# Patient Record
(demographics unavailable — no encounter records)

---

## 2024-12-08 NOTE — PHYSICAL EXAM
[Restricted in physically strenuous activity but ambulatory and able to carry out work of a light or sedentary nature] : Status 1- Restricted in physically strenuous activity but ambulatory and able to carry out work of a light or sedentary nature, e.g., light house work, office work [Normal] : affect appropriate [de-identified] : anicteric  [de-identified] : supple, non tender, FROM [de-identified] : no edema

## 2024-12-08 NOTE — HISTORY OF PRESENT ILLNESS
[Disease: _____________________] : Disease: [unfilled] [T: ___] : T[unfilled] [N: ___] : N[unfilled] [M: ___] : M[unfilled] [AJCC Stage: ____] : AJCC Stage: [unfilled] [de-identified] : HPI:  3/17/15 - first saw Dr. ROSALBA Thacker (urology) for symptoms of BPH and PSA of 4.34 ng/mL. He started on Tamsulosin and lost his follow up ever since.   PSA trend: 3/17/15 - 4.34 ng/mL 9/1/17 - 5.30 4/622 - 18.35 5/27/22 - 26.00  6/11/22 - Prostate MRI: 3 Prostate lesions seen, with extraprostatic extension noted and neurovascular bundle involvement by lesion 1 on the right and lesion 3 on the left. No seminal vesicle invasion or pelvic adenopathy. No suspicious bone lesion identified. PIRADS 5  7/6/22 - underwent Prostate Biopsy: Adenocarcinoma of the Prostate in 15/17 cores, Zachery score 4+4 =8 in 1/17 cores, G4+3 in 2/17 cores, G3+4 in 10/17 cores, and G3+3 in 2/17 cores. Perineural invasion identified. Max involvement 90%.   7/21/22 - Bone Scan: No scan evidence of osseous metastasis.  7/27/22 - seen by Dr. Rizo, Radiation Onc [facility] risk of localized prostate cancer RT in combination with ADT and abiraterone/pred  7/28/2022 received eligard 45mg with bicalutamide two weeks prior   8/16/22 PSMA PET (pylarify): 1. Intense focus of increased radiotracer activity in right lateral aspect of prostate gland, extending from apex to base, corresponds to PIRADS 5 lesion identified on MRI/biopsy-proven prostate carcinoma. The intensity of radiotracer activity is compatible with high PSMA expression. A lesion in the left side of the prostate gland demonstrates low PSMA expression. 2. A 6 mm right posterior obturator lymph node with increased radiotracer activity (SUV 13.0), is compatible with metastasis.   Reports chronic lower back pain radiating to the both legs and feet on tramadol 50mg daily. Urine flow is normal. He denies frequency, hesitancy, urgency, incontinence,  and gross hematuria. Nocturia 3.    10/14/22: Since last visit, patient started RT with Dr. Rizo, he has been tolerating this well. He started taking abiraterone and prednisone about 3 weeks ago and so far has felt well. He is taking the medication on an empty stomach between 3 and 4PM each day. He is eating and drinking well. He is a bit more tired than normal, however he he still goes out and walks for 40 minutes a day. He denies urinary symptoms including dysuria, hematuria, urgency, frequency and incontinence. He has infrequent hot flashes, denies night sweats.   11/4/22: Overall patient has been feeling well on abiraterone and prednisone. His appetite and energy are good, he goes on walks every day. Denies dysuria, hematuria, reports nocturia 1-2 times a night. Having normal bowel movements approximately twice a day. Patient completed pelvic RT on 10/31 and after completing he now has pain in his R hip that is worst when moving to stand. Standing up actually makes it better. He has only taken tylenol for the pain which did not help. Patient does have obturator LN on R side and just finished RT which may be causing the pain.   11/21/22 feels tired, denies hot flash and sweating and leg swelling.   12/16/22: K 3.2 at last appt, 1 week of K supplementation improved to 3.9. Patient continues on abiraterone and prednisone. He takes the prednisone with breakfast and takes abiraterone at about 2-3PM as he does not normally have lunch. Overall he has been feeling well, pain that he previously described in hip and abdomen are improved/resolved. He does still take tramadol twice a day. He has been unable to go on walks due to the cold weather however he has been staying active. Reports nocturia x 2. Patient denies fever, chills, headache, vision changes, cough, shortness of breath, chest pain, palpitations, abdominal pain, nausea/vomiting, diarrhea, constipation, dysuria, hematuria, itching, rashes, joint pain, mucositis, changes in sensation, edema.  1/13/23: Patient has been feeling well. Takes abiraterone in the morning on an empty stomach, prednisone with breakfast. Has nocturia 2-4 times a night. Appetite and energy are good. He is hoping to go to [facility] for 2-3 weeks.    3/6/23: continued on chris/pred for high risk localized prostate ca. His BP today is 159/95, he is asymptomatic, he does not take any blood pressure medications. His HR is 101. Pt denies changes in vision, chest pain, palpitations, shortness of breath, nausea/vomiting and paraesthesias. He does have occasional self limiting headaches. He went t his PCP two days ago and his BP was "normal". Pt returned from [facility] last week, he has been very jet lagged and has been sleeping a lot. He reports soreness in his b/l legs, denies swelling. He has ongoing low back pain that he takes tramadol for as needed. He has a non productive cough, has been using OTC cough medication. Denies fever, chills, sore throat, congestion.   3/20/23: continued on chris/pred for high risk localized prostate ca, returns for BP check. His BP today is 130/87, HR 95. They have been taking BP at home however do not have a list of the measurements. They think the systolic is usually in the 130s and diastolic is usually in the 70-80s. He denies headache, blurry vision/vision changes, nausea, vomiting, shortness of breath, chest pain, palpitations, paraesthesias. He is fatigued and does not do very much throughout the day but his energy has improved from 2 weeks ago after returning from [facility]. He is eating well. He has pain in his back down his legs (not neuropathic pain) he follows with pain management and gets back injections, he takes tramadol 50-100mg once a day. The pain he had in his legs at the most recent visit has resolved, no swelling. Denies urine leakage and incontinence, reports nocturia 2-3 Sometimes has pain in his R inguinal area, unchanged from prior. Having normal bowel movements.   4/17/23: continued on chris/pred for high risk localized prostate cancer.  Taking BP every other day at home, normally runs 120s/80s. No burning with urination or pain, no difficulty empty, no leakage, nocturia x 4. Fatigue is at baseline, energy level might be slightly increasing, now that weather is nicer going out for walks, no dyspnea on exertion, can walk one mile. Intermittent pain in his R side, on and off, not severe. Has ongoing low back pain, pain radiates down legs, uses tramadol once a day. No numbness in hands or feet. L Appetite is good. Occasional itching on skin no rash, was given mupirocin by PCP.  No hot flashes or sweating.  Patient denies fever, chills, headache, vision changes, cough, shortness of breath, chest pain, palpitations, abdominal pain, nausea/vomiting, diarrhea, constipation, dysuria, hematuria, itching, rashes, joint pain, mucositis, changes in sensation.   5/24/23: doing well today, no new issues with abiraterone   8/2/23 feels overall fine, denies fatigue, hot flashes and sweating, freguent urination and gross hematuria.   11/1/23: Patient is doing well, eating great. He does have energy, but gets tired easily. He does have trouble sleeping at night, sleeps throughout the day, occasionally takes trazadone or ambien to help with sleep. He has ongoing low back pain, takes tramadol once a day, pain radiates down legs. He has ongoing numbness in his feet, no falls, walks slowly without issue. He has urine leakage at night, is not wearing diaper, no leakage during the day. Nocturia x 3-4. No hematuria or dysuria. Sometimes feels as though he is not completely emptying his bladder. BP today 161/92, denies headache and vision changes, he takes BP at home and generally it runs 160/90, he is not on BP medication, he is seeing PCP today.    1/8/24 feels well , good appetite, reports occasional sweating, denies fatigue/hot flash, Has normal urination.  [de-identified] : 3/13/24: Patient was admitted to [facility] on Jan 24, he was sent from his PCP to the ED for chest pain and EKG changes, he was found to have type I NSTEMI, cardiac cath showed occluded LAD, distal to prior stent, s/p GEORGIE x 2 Jan 2024. He also had TTE which showed EF 45% and moderate TR. He was also diagnosed with PE, CTA was positive for RLL PE. Patient was started on xarelto, ASA was discontinued. This information was obtained from [facility] portal/discharge summary. His BP today is 148/82, reports that at home systolic BP generally runs about 160s. His HR right not is 105, he denies chest pain and palpitations. He denies shortness of breath/difficulty breathing. No edema. No difficulty walking.   Since being discharged from the hospital, he has been doing okay and recovering. Says his appetite is okay, no issues with constipation or diarrhea. No hot flashes. Nocturia x 2, occ dribbling.    6/13/24 Reports feeling overall fine, denies chest pain, shortness of breath on exertion and leg swelling.   9/9/24 pt is doing well since completing treatment. Appetite is good and maintaining weight. Denies diarrhea or constipation. Denies hot flash/fatigue.  [de-identified] : prostate adenocarcinoma

## 2024-12-08 NOTE — PHYSICAL EXAM
[Restricted in physically strenuous activity but ambulatory and able to carry out work of a light or sedentary nature] : Status 1- Restricted in physically strenuous activity but ambulatory and able to carry out work of a light or sedentary nature, e.g., light house work, office work [Normal] : affect appropriate [de-identified] : anicteric  [de-identified] : no edema [de-identified] : supple, non tender, FROM

## 2024-12-08 NOTE — REVIEW OF SYSTEMS
normal.   Eyes:      Conjunctiva/sclera: Conjunctivae normal.   Cardiovascular:      Rate and Rhythm: Normal rate and regular rhythm.      Heart sounds: Normal heart sounds.   Pulmonary:      Effort: Pulmonary effort is normal. No respiratory distress.      Breath sounds: Normal breath sounds.   Abdominal:      General: Abdomen is flat. There is no distension.      Tenderness: There is no abdominal tenderness. There is no right CVA tenderness or left CVA tenderness.   Musculoskeletal:         General: No swelling.      Lumbar back: Spasms present. No swelling, edema, deformity, signs of trauma, lacerations, tenderness or bony tenderness. Normal range of motion (pain side flexion left). Negative right straight leg raise test and negative left straight leg raise test. No scoliosis.   Lymphadenopathy:      Cervical: No cervical adenopathy.   Skin:     General: Skin is dry.   Neurological:      General: No focal deficit present.      Mental Status: He is alert.   Psychiatric:         Mood and Affect: Mood normal.           Normal rango of motion flexion, extension nomral, cannot side flexion left       ASSESSMENT/PLAN:   Diagnosis Orders   1. Acute left-sided low back pain without sciatica  methylPREDNISolone (MEDROL DOSEPACK) 4 MG tablet    tiZANidine (ZANAFLEX) 4 MG tablet      2. Degeneration of intervertebral disc of lumbar region with discogenic back pain          Patient Instructions   - Medrol (oral steroid) with food,  do not take with  Ibuprofen (max dose 800 mg  3 times daily ) while taking  - Trial alternative muscle relaxant : Tizanidine 4mg 3 times daily as needed may cause drowsiness  Call if you prefer cyclobenzaprine   Report in 1 week if no better              An electronic signature was used to authenticate this note.  --Reece Kruger MD on 10/1/2024   [Fever] : no fever [Chills] : no chills [Night Sweats] : no night sweats [Fatigue] : fatigue [Chest Pain] : no chest pain [Palpitations] : no palpitations [Lower Ext Edema] : no lower extremity edema [Shortness Of Breath] : no shortness of breath [Cough] : no cough [SOB on Exertion] : no shortness of breath during exertion [Abdominal Pain] : no abdominal pain [Vomiting] : no vomiting [Constipation] : no constipation [Diarrhea: Grade 0] : Diarrhea: Grade 0 [Dysuria] : no dysuria [Joint Pain] : no joint pain [Muscle Pain] : no muscle pain [Skin Rash] : no skin rash [Dizziness] : no dizziness [Difficulty Walking] : no difficulty walking [Hot Flashes] : no hot flashes

## 2024-12-08 NOTE — HISTORY OF PRESENT ILLNESS
[Disease: _____________________] : Disease: [unfilled] [T: ___] : T[unfilled] [N: ___] : N[unfilled] [M: ___] : M[unfilled] [AJCC Stage: ____] : AJCC Stage: [unfilled] [de-identified] : HPI:  3/17/15 - first saw Dr. ROSALBA Thacker (urology) for symptoms of BPH and PSA of 4.34 ng/mL. He started on Tamsulosin and lost his follow up ever since.   PSA trend: 3/17/15 - 4.34 ng/mL 9/1/17 - 5.30 4/622 - 18.35 5/27/22 - 26.00  6/11/22 - Prostate MRI: 3 Prostate lesions seen, with extraprostatic extension noted and neurovascular bundle involvement by lesion 1 on the right and lesion 3 on the left. No seminal vesicle invasion or pelvic adenopathy. No suspicious bone lesion identified. PIRADS 5  7/6/22 - underwent Prostate Biopsy: Adenocarcinoma of the Prostate in 15/17 cores, Zachery score 4+4 =8 in 1/17 cores, G4+3 in 2/17 cores, G3+4 in 10/17 cores, and G3+3 in 2/17 cores. Perineural invasion identified. Max involvement 90%.   7/21/22 - Bone Scan: No scan evidence of osseous metastasis.  7/27/22 - seen by Dr. Rizo, Radiation Onc and [facility] risk of localized prostate cancer RT in combination with ADT and abiraterone/pred  7/28/2022 received eligard 45mg with bicalutamide two weeks prior   8/16/22 PSMA PET (pylarify): 1. Intense focus of increased radiotracer activity in right lateral aspect of prostate gland, extending from apex to base, corresponds to PIRADS 5 lesion identified on MRI/biopsy-proven prostate carcinoma. The intensity of radiotracer activity is compatible with high PSMA expression. A lesion in the left side of the prostate gland demonstrates low PSMA expression. 2. A 6 mm right posterior obturator lymph node with increased radiotracer activity (SUV 13.0), is compatible with metastasis.   Reports chronic lower back pain radiating to the both legs and feet on tramadol 50mg daily. Urine flow is normal. He denies frequency, hesitancy, urgency, incontinence,  and gross hematuria. Nocturia 3.    10/14/22: Since last visit, patient started RT with Dr. Rizo, he has been tolerating this well. He started taking abiraterone and prednisone about 3 weeks ago and so far has felt well. He is taking the medication on an empty stomach between 3 and 4PM each day. He is eating and drinking well. He is a bit more tired than normal, however he he still goes out and walks for 40 minutes a day. He denies urinary symptoms including dysuria, hematuria, urgency, frequency and incontinence. He has infrequent hot flashes, denies night sweats.   11/4/22: Overall patient has been feeling well on abiraterone and prednisone. His appetite and energy are good, he goes on walks every day. Denies dysuria, hematuria, reports nocturia 1-2 times a night. Having normal bowel movements approximately twice a day. Patient completed pelvic RT on 10/31 and after completing he now has pain in his R hip that is worst when moving to stand. Standing up actually makes it better. He has only taken tylenol for the pain which did not help. Patient does have obturator LN on R side and just finished RT which may be causing the pain.   11/21/22 feels tired, denies hot flash and sweating and leg swelling.   12/16/22: K 3.2 at last appt, 1 week of K supplementation improved to 3.9. Patient continues on abiraterone and prednisone. He takes the prednisone with breakfast and takes abiraterone at about 2-3PM as he does not normally have lunch. Overall he has been feeling well, pain that he previously described in hip and abdomen are improved/resolved. He does still take tramadol twice a day. He has been unable to go on walks due to the cold weather however he has been staying active. Reports nocturia x 2. Patient denies fever, chills, headache, vision changes, cough, shortness of breath, chest pain, palpitations, abdominal pain, nausea/vomiting, diarrhea, constipation, dysuria, hematuria, itching, rashes, joint pain, mucositis, changes in sensation, edema.  1/13/23: Patient has been feeling well. Takes abiraterone in the morning on an empty stomach, prednisone with breakfast. Has nocturia 2-4 times a night. Appetite and energy are good. He is hoping to go to [facility] for 2-3 weeks.    3/6/23: continued on chris/pred for high risk localized prostate ca. His BP today is 159/95, he is asymptomatic, he does not take any blood pressure medications. His HR is 101. Pt denies changes in vision, chest pain, palpitations, shortness of breath, nausea/vomiting and paraesthesias. He does have occasional self limiting headaches. He went t his PCP two days ago and his BP was "normal". Pt returned from [facility] last week, he has been very jet lagged and has been sleeping a lot. He reports soreness in his b/l legs, denies swelling. He has ongoing low back pain that he takes tramadol for as needed. He has a non productive cough, has been using OTC cough medication. Denies fever, chills, sore throat, congestion.   3/20/23: continued on chris/pred for high risk localized prostate ca, returns for BP check. His BP today is 130/87, HR 95. They have been taking BP at home however do not have a list of the measurements. They think the systolic is usually in the 130s and diastolic is usually in the 70-80s. He denies headache, blurry vision/vision changes, nausea, vomiting, shortness of breath, chest pain, palpitations, paraesthesias. He is fatigued and does not do very much throughout the day but his energy has improved from 2 weeks ago after returning from [facility]. He is eating well. He has pain in his back down his legs (not neuropathic pain) he follows with pain management and gets back injections, he takes tramadol 50-100mg once a day. The pain he had in his legs at the most recent visit has resolved, no swelling. Denies urine leakage and incontinence, reports nocturia 2-3 Sometimes has pain in his R inguinal area, unchanged from prior. Having normal bowel movements.   4/17/23: continued on chris/pred for high risk localized prostate cancer.  Taking BP every other day at home, normally runs 120s/80s. No burning with urination or pain, no difficulty empty, no leakage, nocturia x 4. Fatigue is at baseline, energy level might be slightly increasing, now that weather is nicer going out for walks, no dyspnea on exertion, can walk one mile. Intermittent pain in his R side, on and off, not severe. Has ongoing low back pain, pain radiates down legs, uses tramadol once a day. No numbness in hands or feet. L Appetite is good. Occasional itching on skin no rash, was given mupirocin by PCP.  No hot flashes or sweating.  Patient denies fever, chills, headache, vision changes, cough, shortness of breath, chest pain, palpitations, abdominal pain, nausea/vomiting, diarrhea, constipation, dysuria, hematuria, itching, rashes, joint pain, mucositis, changes in sensation.   5/24/23: doing well today, no new issues with abiraterone   8/2/23 feels overall fine, denies fatigue, hot flashes and sweating, freguent urination and gross hematuria.   11/1/23: Patient is doing well, eating great. He does have energy, but gets tired easily. He does have trouble sleeping at night, sleeps throughout the day, occasionally takes trazadone or ambien to help with sleep. He has ongoing low back pain, takes tramadol once a day, pain radiates down legs. He has ongoing numbness in his feet, no falls, walks slowly without issue. He has urine leakage at night, is not wearing diaper, no leakage during the day. Nocturia x 3-4. No hematuria or dysuria. Sometimes feels as though he is not completely emptying his bladder. BP today 161/92, denies headache and vision changes, he takes BP at home and generally it runs 160/90, he is not on BP medication, he is seeing PCP today.    1/8/24 feels well , good appetite, reports occasional sweating, denies fatigue/hot flash, Has normal urination.  [de-identified] : prostate adenocarcinoma  [de-identified] : 3/13/24: Patient was admitted to [facility] on Jan 24, he was sent from his PCP to the ED for chest pain and EKG changes, he was found to have type I NSTEMI, cardiac cath showed occluded LAD, distal to prior stent, s/p GEORGIE x 2 Jan 2024. He also had TTE which showed EF 45% and moderate TR. He was also diagnosed with PE, CTA was positive for RLL PE. Patient was started on xarelto, ASA was discontinued. This information was obtained from [facility] portal/discharge summary. His BP today is 148/82, reports that at home systolic BP generally runs about 160s. His HR right not is 105, he denies chest pain and palpitations. He denies shortness of breath/difficulty breathing. No edema. No difficulty walking.   Since being discharged from the hospital, he has been doing okay and recovering. Says his appetite is okay, no issues with constipation or diarrhea. No hot flashes. Nocturia x 2, occ dribbling.    6/13/24 Reports feeling overall fine, denies chest pain, shortness of breath on exertion and leg swelling.   9/9/24 pt is doing well since completing treatment. Appetite is good and maintaining weight. Denies diarrhea or constipation. Denies hot flash/fatigue.

## 2024-12-08 NOTE — ASSESSMENT
[FreeTextEntry1] : Mr. Bradley is a 72 year old male with high risk of localized prostate cancer given his positive regional node, T3a, humza 4+4 and PSA 26ng/mg. Given his high risk features he requires [place] protocol with RT to the pelvic area and prostate in combination with 2 to 3 years of ADT and 2 years of abiraterone/prednisone. He started abiraterone September 2022. Completed RT to pelvis 10/31/22. PSA undetectable as of March 2023.  Patient was admitted to [facility] on Jan 24 2024, he was found to have type I NSTEMI, cardiac cath showed occluded LAD, distal to prior stent, s/p GEORGIE x 2. He also had TTE which showed EF 45% and moderate TR. He was also diagnosed with PE, CTA was positive for RLL PE. Patient was started on xarelto, ASA was discontinued. In light of ischemic heart disease and decreased ejection fraction, abiraterone and prednisone are to be discontinued. Patient completed 18 months of abiraterone by March 2024, when meds were d/naty.   Started on abiraterone and prednisone in September 2022, planned for total of 2 years, however in light of NSTEMI, ischemic heart disease and decreased EF, abiraterone will be discontinued today, he will taper off prednisone (5mg QOD x 2 weeks)   Plan [1] High risk localized prostate cancer -  S/p 2 years of Eligard inj q6mo with Urology, final dose on 2/8/24  - PSA remain undetectable; 6/13/24 ~ < 0.01                                              3/13/24 ~ < 0.01  - Repeat labs on 9/9/24   [2] Osteopenia - S/p DEXA scan 9/2022 with osteopenia, FRAX hip fracture risk 2.7%. - Repeat DEXA scan in 2 years. Ordered today 9/9/24, pt to call and schedule - Continue Ca + Vit D for bone health.  [3] CV with Dec EF/HTN / PE [diagnosed at [facility] in Jan 2024  - No longer on Xalerto. Started on Aspirin 81mg daily - BP on 9/9/24 = 155/79. Yaa states @ Home sys usually 140s.    - Continue to f/u with Cardiology / PCP if BP >160 / >90    RTC 3 months

## 2024-12-08 NOTE — HISTORY OF PRESENT ILLNESS
[Disease: _____________________] : Disease: [unfilled] [T: ___] : T[unfilled] [N: ___] : N[unfilled] [M: ___] : M[unfilled] [AJCC Stage: ____] : AJCC Stage: [unfilled] [de-identified] : HPI:  3/17/15 - first saw Dr. ROSALBA Thacker (urology) for symptoms of BPH and PSA of 4.34 ng/mL. He started on Tamsulosin and lost his follow up ever since.   PSA trend: 3/17/15 - 4.34 ng/mL 9/1/17 - 5.30 4/622 - 18.35 5/27/22 - 26.00  6/11/22 - Prostate MRI: 3 Prostate lesions seen, with extraprostatic extension noted and neurovascular bundle involvement by lesion 1 on the right and lesion 3 on the left. No seminal vesicle invasion or pelvic adenopathy. No suspicious bone lesion identified. PIRADS 5  7/6/22 - underwent Prostate Biopsy: Adenocarcinoma of the Prostate in 15/17 cores, Zachery score 4+4 =8 in 1/17 cores, G4+3 in 2/17 cores, G3+4 in 10/17 cores, and G3+3 in 2/17 cores. Perineural invasion identified. Max involvement 90%.   7/21/22 - Bone Scan: No scan evidence of osseous metastasis.  7/27/22 - seen by Dr. Rizo, Radiation Onc and [facility] high risk of localized prostate cancer RT in combination with ADT and abiraterone/pred  7/28/2022 received eligard 45mg with bicalutamide two weeks prior   8/16/22 PSMA PET (pylarify): 1. Intense focus of increased radiotracer activity in right lateral aspect of prostate gland, extending from apex to base, corresponds to PIRADS 5 lesion identified on MRI/biopsy-proven prostate carcinoma. The intensity of radiotracer activity is compatible with high PSMA expression. A lesion in the left side of the prostate gland demonstrates low PSMA expression. 2. A 6 mm right posterior obturator lymph node with increased radiotracer activity (SUV 13.0), is compatible with metastasis.   Reports chronic lower back pain radiating to the both legs and feet on tramadol 50mg daily. Urine flow is normal. He denies frequency, hesitancy, urgency, incontinence,  and gross hematuria. Nocturia 3.    10/14/22: Since last visit, patient started RT with Dr. Rizo, he has been tolerating this well. He started taking abiraterone and prednisone about 3 weeks ago and so far has felt well. He is taking the medication on an empty stomach between 3 and 4PM each day. He is eating and drinking well. He is a bit more tired than normal, however he he still goes out and walks for 40 minutes a day. He denies urinary symptoms including dysuria, hematuria, urgency, frequency and incontinence. He has infrequent hot flashes, denies night sweats.   11/4/22: Overall patient has been feeling well on abiraterone and prednisone. His appetite and energy are good, he goes on walks every day. Denies dysuria, hematuria, reports nocturia 1-2 times a night. Having normal bowel movements approximately twice a day. Patient completed pelvic RT on 10/31 and after completing he now has pain in his R hip that is worst when moving to stand. Standing up actually makes it better. He has only taken tylenol for the pain which did not help. Patient does have obturator LN on R side and just finished RT which may be causing the pain.   11/21/22 feels tired, denies hot flash and sweating and leg swelling.   12/16/22: K 3.2 at last appt, 1 week of K supplementation improved to 3.9. Patient continues on abiraterone and prednisone. He takes the prednisone with breakfast and takes abiraterone at about 2-3PM as he does not normally have lunch. Overall he has been feeling well, pain that he previously described in hip and abdomen are improved/resolved. He does still take tramadol twice a day. He has been unable to go on walks due to the cold weather however he has been staying active. Reports nocturia x 2. Patient denies fever, chills, headache, vision changes, cough, shortness of breath, chest pain, palpitations, abdominal pain, nausea/vomiting, diarrhea, constipation, dysuria, hematuria, itching, rashes, joint pain, mucositis, changes in sensation, edema.  1/13/23: Patient has been feeling well. Takes abiraterone in the morning on an empty stomach, prednisone with breakfast. Has nocturia 2-4 times a night. Appetite and energy are good. He is hoping to go to [facility] for 2-3 weeks.    3/6/23: continued on chris/pred for high risk localized prostate ca. His BP today is 159/95, he is asymptomatic, he does not take any blood pressure medications. His HR is 101. Pt denies changes in vision, chest pain, palpitations, shortness of breath, nausea/vomiting and paraesthesias. He does have occasional self limiting headaches. He went t his PCP two days ago and his BP was "normal". Pt returned from [facility] last week, he has been very jet lagged and has been sleeping a lot. He reports soreness in his b/l legs, denies swelling. He has ongoing low back pain that he takes tramadol for as needed. He has a non productive cough, has been using OTC cough medication. Denies fever, chills, sore throat, congestion.   3/20/23: continued on chris/pred for high risk localized prostate ca, returns for BP check. His BP today is 130/87, HR 95. They have been taking BP at home however do not have a list of the measurements. They think the systolic is usually in the 130s and diastolic is usually in the 70-80s. He denies headache, blurry vision/vision changes, nausea, vomiting, shortness of breath, chest pain, palpitations, paraesthesias. He is fatigued and does not do very much throughout the day but his energy has improved from 2 weeks ago after returning from [facility]. He is eating well. He has pain in his back down his legs (not neuropathic pain) he follows with pain management and gets back injections, he takes tramadol 50-100mg once a day. The pain he had in his legs at the most recent visit has resolved, no swelling. Denies urine leakage and incontinence, reports nocturia 2-3 Sometimes has pain in his R inguinal area, unchanged from prior. Having normal bowel movements.   4/17/23: continued on chris/pred for high risk localized prostate cancer.  Taking BP every other day at home, normally runs 120s/80s. No burning with urination or pain, no difficulty empty, no leakage, nocturia x 4. Fatigue is at baseline, energy level might be slightly increasing, now that weather is nicer going out for walks, no dyspnea on exertion, can walk one mile. Intermittent pain in his R side, on and off, not severe. Has ongoing low back pain, pain radiates down legs, uses tramadol once a day. No numbness in hands or feet. L Appetite is good. Occasional itching on skin no rash, was given mupirocin by PCP.  No hot flashes or sweating.  Patient denies fever, chills, headache, vision changes, cough, shortness of breath, chest pain, palpitations, abdominal pain, nausea/vomiting, diarrhea, constipation, dysuria, hematuria, itching, rashes, joint pain, mucositis, changes in sensation.   5/24/23: doing well today, no new issues with abiraterone   8/2/23 feels overall fine, denies fatigue, hot flashes and sweating, freguent urination and gross hematuria.   11/1/23: Patient is doing well, eating great. He does have energy, but gets tired easily. He does have trouble sleeping at night, sleeps throughout the day, occasionally takes trazadone or ambien to help with sleep. He has ongoing low back pain, takes tramadol once a day, pain radiates down legs. He has ongoing numbness in his feet, no falls, walks slowly without issue. He has urine leakage at night, is not wearing diaper, no leakage during the day. Nocturia x 3-4. No hematuria or dysuria. Sometimes feels as though he is not completely emptying his bladder. BP today 161/92, denies headache and vision changes, he takes BP at home and generally it runs 160/90, he is not on BP medication, he is seeing PCP today.    1/8/24 feels well , good appetite, reports occasional sweating, denies fatigue/hot flash, Has normal urination.  [de-identified] : 3/13/24: Patient was admitted to [facility] on Jan 24, he was sent from his PCP to the ED for chest pain and EKG changes, he was found to have type I NSTEMI, cardiac cath showed occluded LAD, distal to prior stent, s/p GEORGIE x 2 Jan 2024. He also had TTE which showed EF 45% and moderate TR. He was also diagnosed with PE, CTA was positive for RLL PE. Patient was started on xarelto, ASA was discontinued. This information was obtained from [facility] portal/discharge summary. His BP today is 148/82, reports that at home systolic BP generally runs about 160s. His HR right not is 105, he denies chest pain and palpitations. He denies shortness of breath/difficulty breathing. No edema. No difficulty walking.   Since being discharged from the hospital, he has been doing okay and recovering. Says his appetite is okay, no issues with constipation or diarrhea. No hot flashes. Nocturia x 2, occ dribbling.    6/13/24 Reports feeling overall fine, denies chest pain, shortness of breath on exertion and leg swelling.   9/9/24 pt is doing well since completing treatment. Appetite is good and maintaining weight. Denies diarrhea or constipation. Denies hot flash/fatigue.  [de-identified] : prostate adenocarcinoma

## 2024-12-08 NOTE — ASSESSMENT
[FreeTextEntry1] : Mr. Bradley is a 72 year old male with high risk of localized prostate cancer given his positive regional node, T3a, humza 4+4 and PSA 26ng/mg. Given his high risk features he requires [facility] protocol with RT to the pelvic area and prostate in combination with 2 to 3 years of ADT and 2 years of abiraterone/prednisone. He started abiraterone September 2022. Completed RT to pelvis 10/31/22. PSA undetectable as of March 2023.  Patient was admitted to [facility] on Jan 24 2024, he was found to have type I NSTEMI, cardiac cath showed occluded LAD, distal to prior stent, s/p GEORGIE x 2. He also had TTE which showed EF 45% and moderate TR. He was also diagnosed with PE, CTA was positive for RLL PE. Patient was started on xarelto, ASA was discontinued. In light of ischemic heart disease and decreased ejection fraction, abiraterone and prednisone are to be discontinued. Patient completed 18 months of abiraterone by March 2024, when meds were d/naty.   Started on abiraterone and prednisone in September 2022, planned for total of 2 years, however in light of NSTEMI, ischemic heart disease and decreased EF, abiraterone will be discontinued today, he will taper off prednisone (5mg QOD x 2 weeks)   Plan [1] High risk localized prostate cancer -  S/p 2 years of Eligard inj q6mo with Urology, final dose on 2/8/24  - PSA remain undetectable; 6/13/24 ~ < 0.01                                              3/13/24 ~ < 0.01  - Repeat labs on 9/9/24   [2] Osteopenia - S/p DEXA scan 9/2022 with osteopenia, FRAX hip fracture risk 2.7%. - Repeat DEXA scan in 2 years. Ordered today 9/9/24, pt to call and schedule - Continue Ca + Vit D for bone health.  [3] CV with Dec EF/HTN / PE [diagnosed at [facility] in Jan 2024  - No longer on Xalerto. Started on Aspirin 81mg daily - BP on 9/9/24 = 155/79. Yaa states @ Home sys usually 140s.    - Continue to f/u with Cardiology / PCP if BP >160 / >90    RTC 3 months

## 2024-12-08 NOTE — ASSESSMENT
[FreeTextEntry1] : Mr. Bradley is a 72 year old male with high risk of localized prostate cancer given his positive regional node, T3a, humza 4+4 and PSA 26ng/mg. Given his high risk features he requires [facility] protocol with RT to the pelvic area and prostate in combination with 2 to 3 years of ADT and 2 years of abiraterone/prednisone. He started abiraterone September 2022. Completed RT to pelvis 10/31/22. PSA undetectable as of March 2023.  Patient was admitted to [facility] on Jan 24 2024, he was found to have type I NSTEMI, cardiac cath showed occluded LAD, distal to prior stent, s/p GEORGIE x 2. He also had TTE which showed EF 45% and moderate TR. He was also diagnosed with PE, CTA was positive for RLL PE. Patient was started on xarelto, ASA was discontinued. In light of ischemic heart disease and decreased ejection fraction, abiraterone and prednisone are to be discontinued. Patient completed 18 months of abiraterone by March 2024, when meds were d/naty.   Started on abiraterone and prednisone in September 2022, planned for total of 2 years, however in light of NSTEMI, ischemic heart disease and decreased EF, abiraterone will be discontinued today, he will taper off prednisone (5mg QOD x 2 weeks)   Plan [1] High risk localized prostate cancer -  S/p 2 years of Eligard inj q6mo with Urology, final dose on 2/8/24  - PSA remain undetectable; 6/13/24 ~ < 0.01                                              3/13/24 ~ < 0.01  - Repeat labs on 9/9/24   [2] Osteopenia - S/p DEXA scan 9/2022 with osteopenia, FRAX hip fracture risk 2.7%. - Repeat DEXA scan in 2 years. Ordered today 9/9/24, pt to call and schedule - Continue Ca + Vit D for bone health.  [3] CV with Dec EF/HTN / PE [diagnosed at [facility] in Jan 2024  - No longer on Xalerto. Started on Aspirin 81mg daily - BP on 9/9/24 = 155/79. Yaa states @ Home sys usually 140s.    - Continue to f/u with Cardiology / PCP if BP >160 / >90    RTC 3 months

## 2024-12-08 NOTE — PHYSICAL EXAM
[Restricted in physically strenuous activity but ambulatory and able to carry out work of a light or sedentary nature] : Status 1- Restricted in physically strenuous activity but ambulatory and able to carry out work of a light or sedentary nature, e.g., light house work, office work [Normal] : affect appropriate [de-identified] : anicteric  [de-identified] : no edema [de-identified] : supple, non tender, FROM

## 2024-12-08 NOTE — REASON FOR VISIT
[Follow-Up Visit] : a follow-up [Family Member] : family member [Ad Hoc ] : provided by an ad hoc  [FreeTextEntry2] : high risk localized  prostate cancer  [Interpreters_FullName] : Rissa [Interpreters_Relationshiptopatient] : daughter

## 2024-12-08 NOTE — REVIEW OF SYSTEMS
[Fever] : no fever [Chills] : no chills [Night Sweats] : no night sweats [Fatigue] : fatigue [Chest Pain] : no chest pain [Palpitations] : no palpitations [Lower Ext Edema] : no lower extremity edema [Shortness Of Breath] : no shortness of breath [Cough] : no cough [SOB on Exertion] : no shortness of breath during exertion [Abdominal Pain] : no abdominal pain [Vomiting] : no vomiting [Constipation] : no constipation [Diarrhea: Grade 0] : Diarrhea: Grade 0 [Dysuria] : no dysuria [Joint Pain] : no joint pain [Muscle Pain] : no muscle pain [Skin Rash] : no skin rash [Dizziness] : no dizziness [Difficulty Walking] : no difficulty walking [Hot Flashes] : no hot flashes

## 2025-03-08 NOTE — HISTORY OF PRESENT ILLNESS
[Disease: _____________________] : Disease: [unfilled] [T: ___] : T[unfilled] [N: ___] : N[unfilled] [M: ___] : M[unfilled] [AJCC Stage: ____] : AJCC Stage: [unfilled] [de-identified] : HPI:  3/17/15 - first saw Dr. ROSALBA Thacker (urology) for symptoms of BPH and PSA of 4.34 ng/mL. He started on Tamsulosin and lost his follow up ever since.   PSA trend: 3/17/15 - 4.34 ng/mL 9/1/17 - 5.30 4/622 - 18.35 5/27/22 - 26.00  6/11/22 - Prostate MRI: 3 Prostate lesions seen, with extraprostatic extension noted and neurovascular bundle involvement by lesion 1 on the right and lesion 3 on the left. No seminal vesicle invasion or pelvic adenopathy. No suspicious bone lesion identified. PIRADS 5  7/6/22 - underwent Prostate Biopsy: Adenocarcinoma of the Prostate in 15/17 cores, Zachery score 4+4 =8 in 1/17 cores, G4+3 in 2/17 cores, G3+4 in 10/17 cores, and G3+3 in 2/17 cores. Perineural invasion identified. Max involvement 90%.   7/21/22 - Bone Scan: No scan evidence of osseous metastasis.  7/27/22 - seen by Dr. Rizo, Radiation Onc and will be managed with [facility] protocol of high risk of localized prostate cancer RT in combination with ADT and abiraterone/pred  7/28/2022 received eligard 45mg with bicalutamide two weeks prior   8/16/22 PSMA PET (pylarify): 1. Intense focus of increased radiotracer activity in right lateral aspect of prostate gland, extending from apex to base, corresponds to PIRADS 5 lesion identified on MRI/biopsy-proven prostate carcinoma. The intensity of radiotracer activity is compatible with high PSMA expression. A lesion in the left side of the prostate gland demonstrates low PSMA expression. 2. A 6 mm right posterior obturator lymph node with increased radiotracer activity (SUV 13.0), is compatible with metastasis.   Reports chronic lower back pain radiating to the both legs and feet on tramadol 50mg daily. Urine flow is normal. He denies frequency, hesitancy, urgency, incontinence,  and gross hematuria. Nocturia 3.    10/14/22: Since last visit, patient started RT with Dr. Rizo, he has been tolerating this well. He started taking abiraterone and prednisone about 3 weeks ago and so far has felt well. He is taking the medication on an empty stomach between 3 and 4PM each day. He is eating and drinking well. He is a bit more tired than normal, however he he still goes out and walks for 40 minutes a day. He denies urinary symptoms including dysuria, hematuria, urgency, frequency and incontinence. He has infrequent hot flashes, denies night sweats.   11/4/22: Overall patient has been feeling well on abiraterone and prednisone. His appetite and energy are good, he goes on walks every day. Denies dysuria, hematuria, reports nocturia 1-2 times a night. Having normal bowel movements approximately twice a day. Patient completed pelvic RT on 10/31 and after completing he now has pain in his R hip that is worst when moving to stand. Standing up actually makes it better. He has only taken tylenol for the pain which did not help. Patient does have obturator LN on R side and just finished RT which may be causing the pain.   11/21/22 feels tired, denies hot flash and sweating and leg swelling.   12/16/22: K 3.2 at last appt, 1 week of K supplementation improved to 3.9. Patient continues on abiraterone and prednisone. He takes the prednisone with breakfast and takes abiraterone at about 2-3PM as he does not normally have lunch. Overall he has been feeling well, pain that he previously described in hip and abdomen are improved/resolved. He does still take tramadol twice a day. He has been unable to go on walks due to the cold weather however he has been staying active. Reports nocturia x 2. Patient denies fever, chills, headache, vision changes, cough, shortness of breath, chest pain, palpitations, abdominal pain, nausea/vomiting, diarrhea, constipation, dysuria, hematuria, itching, rashes, joint pain, mucositis, changes in sensation, edema.  1/13/23: Patient has been feeling well. Takes abiraterone in the morning on an empty stomach, prednisone with breakfast. Has nocturia 2-4 times a night. Appetite and energy are good. He is hoping to go to [facility] for 2-3 weeks.    3/6/23: continued on chris/pred for high risk localized prostate ca. His BP today is 159/95, he is asymptomatic, he does not take any blood pressure medications. His HR is 101. Pt denies changes in vision, chest pain, palpitations, shortness of breath, nausea/vomiting and paraesthesias. He does have occasional self limiting headaches. He went t his PCP two days ago and his BP was "normal". Pt returned from [facility] last week, he has been very jet lagged and has been sleeping a lot. He reports soreness in his b/l legs, denies swelling. He has ongoing low back pain that he takes tramadol for as needed. He has a non productive cough, has been using OTC cough medication. Denies fever, chills, sore throat, congestion.   3/20/23: continued on chris/pred for high risk localized prostate ca, returns for BP check. His BP today is 130/87, HR 95. They have been taking BP at home however do not have a list of the measurements. They think the systolic is usually in the 130s and diastolic is usually in the 70-80s. He denies headache, blurry vision/vision changes, nausea, vomiting, shortness of breath, chest pain, palpitations, paraesthesias. He is fatigued and does not do very much throughout the day but his energy has improved from 2 weeks ago after returning from [facility]. He is eating well. He has pain in his back down his legs (not neuropathic pain) he follows with pain management and gets back injections, he takes tramadol 50-100mg once a day. The pain he had in his legs at the most recent visit has resolved, no swelling. Denies urine leakage and incontinence, reports nocturia 2-3 Sometimes has pain in his R inguinal area, unchanged from prior. Having normal bowel movements.   4/17/23: continued on chris/pred for high risk localized prostate cancer.  Taking BP every other day at home, normally runs 120s/80s. No burning with urination or pain, no difficulty empty, no leakage, nocturia x 4. Fatigue is at baseline, energy level might be slightly increasing, now that weather is nicer going out for walks, no dyspnea on exertion, can walk one mile. Intermittent pain in his R side, on and off, not severe. Has ongoing low back pain, pain radiates down legs, uses tramadol once a day. No numbness in hands or feet. L Appetite is good. Occasional itching on skin no rash, was given mupirocin by PCP.  No hot flashes or sweating.  Patient denies fever, chills, headache, vision changes, cough, shortness of breath, chest pain, palpitations, abdominal pain, nausea/vomiting, diarrhea, constipation, dysuria, hematuria, itching, rashes, joint pain, mucositis, changes in sensation.   5/24/23: doing well today, no new issues with abiraterone   8/2/23 feels overall fine, denies fatigue, hot flashes and sweating, freguent urination and gross hematuria.   11/1/23: Patient is doing well, eating great. He does have energy, but gets tired easily. He does have trouble sleeping at night, sleeps throughout the day, occasionally takes trazadone or ambien to help with sleep. He has ongoing low back pain, takes tramadol once a day, pain radiates down legs. He has ongoing numbness in his feet, no falls, walks slowly without issue. He has urine leakage at night, is not wearing diaper, no leakage during the day. Nocturia x 3-4. No hematuria or dysuria. Sometimes feels as though he is not completely emptying his bladder. BP today 161/92, denies headache and vision changes, he takes BP at home and generally it runs 160/90, he is not on BP medication, he is seeing PCP today.    1/8/24 feels well , good appetite, reports occasional sweating, denies fatigue/hot flash, Has normal urination.  [de-identified] : prostate adenocarcinoma  [de-identified] : 3/13/24: Patient was admitted to [facility] on Jan 24, he was sent from his PCP to the ED for chest pain and EKG changes, he was found to have type I NSTEMI, cardiac cath showed occluded LAD, distal to prior stent, s/p GEORGIE x 2 Jan 2024. He also had TTE which showed EF 45% and moderate TR. He was also diagnosed with PE, CTA was positive for RLL PE. Patient was started on xarelto, ASA was discontinued. This information was obtained from [facility] portal/discharge summary. His BP today is 148/82, reports that at home systolic BP generally runs about 160s. His HR right not is 105, he denies chest pain and palpitations. He denies shortness of breath/difficulty breathing. No edema. No difficulty walking.   Since being discharged from the hospital, he has been doing okay and recovering. Says his appetite is okay, no issues with constipation or diarrhea. No hot flashes. Nocturia x 2, occ dribbling.    6/13/24 Reports feeling overall fine, denies chest pain, shortness of breath on exertion and leg swelling.   9/9/24 pt is doing well since completing treatment. Appetite is good and maintaining weight. Denies diarrhea or constipation. Denies hot flash/fatigue.  12/11/24:Reports feels well overall. Denies hot flashes, je nausea, vomiting, diarrhea.  3/18/25 - s/p definitive RT to prostate/pelvis completed Oct 2022, s/p ADT x 2yrs (last Eligard 6mo inj given 2/8/24), s/p abiraterone (Sept 2022 - March 2024) which was discontinued early d/t ischemic heart disease and impaired EF.

## 2025-03-08 NOTE — ASSESSMENT
[FreeTextEntry1] : Mr. Bradley is a 73 year old male with high risk of localized prostate cancer given his positive regional node, T3a, humza 4+4 and PSA 26ng/mg. Given his high risk features he requires Stampede protocol with RT to the pelvic area and prostate in combination with 2 to 3 years of ADT and 2 years of abiraterone/prednisone. He started abiraterone September 2022. Completed RT to pelvis 10/31/22. PSA undetectable as of March 2023. Abiraterone discontinued after 18mo (in early in March 2024) d/t NSTEMI with ischemic heart disease requiring cardiac stents and declining EF. 2 years of Eligard completed with last inj given 2/8/24.   Plan  High risk localized prostate cancer: - 2 years of Eligard completed, last 6mo inj given 2/8/24 - PSA has been undetectable since March 2023. Most recent PSA on 12/11/24 was again undetectable. Repeat PSA today is pending.   CV with dec EF, HTN.  PE (diagnosed at [facility] in Jan 2024) - No longer on Xarelto. Started on Aspirin 81mg daily - Continue to f/u with Cardiology  Instructed to contact our office with any new/worsening symptoms. Pt and family educated regarding plan of care, all questions/concerns addressed to the best of my abilities and their apparent satisfaction. RTC 3 months

## 2025-03-18 NOTE — PHYSICAL EXAM
[Restricted in physically strenuous activity but ambulatory and able to carry out work of a light or sedentary nature] : Status 1- Restricted in physically strenuous activity but ambulatory and able to carry out work of a light or sedentary nature, e.g., light house work, office work [Normal] : affect appropriate [de-identified] : anicteric  [de-identified] : no LE edema

## 2025-03-18 NOTE — PHYSICAL EXAM
[Restricted in physically strenuous activity but ambulatory and able to carry out work of a light or sedentary nature] : Status 1- Restricted in physically strenuous activity but ambulatory and able to carry out work of a light or sedentary nature, e.g., light house work, office work [Normal] : affect appropriate [de-identified] : anicteric  [de-identified] : no LE edema

## 2025-03-18 NOTE — REASON FOR VISIT
[FreeTextEntry2] : high risk localized  prostate cancer  [Interpreters_FullName] : Rissa [Interpreters_Relationshiptopatient] : daughter

## 2025-03-18 NOTE — HISTORY OF PRESENT ILLNESS
[de-identified] : HPI:  3/17/15 - first saw Dr. ROSALBA Thacker (urology) for symptoms of BPH and PSA of 4.34 ng/mL. He started on Tamsulosin and lost his follow up ever since.   PSA trend: 3/17/15 - 4.34 ng/mL 9/1/17 - 5.30 4/622 - 18.35 5/27/22 - 26.00  6/11/22 - Prostate MRI: 3 Prostate lesions seen, with extraprostatic extension noted and neurovascular bundle involvement by lesion 1 on the right and lesion 3 on the left. No seminal vesicle invasion or pelvic adenopathy. No suspicious bone lesion identified. PIRADS 5  7/6/22 - underwent Prostate Biopsy: Adenocarcinoma of the Prostate in 15/17 cores, Zachery score 4+4 =8 in 1/17 cores, G4+3 in 2/17 cores, G3+4 in 10/17 cores, and G3+3 in 2/17 cores. Perineural invasion identified. Max involvement 90%.   7/21/22 - Bone Scan: No scan evidence of osseous metastasis.  7/27/22 - seen by Dr. Rizo, Radiation Onc [facility] of [facility] cancer RT in combination with ADT and abiraterone/pred  7/28/2022 received eligard 45mg with bicalutamide two weeks prior   8/16/22 PSMA PET (pylarify): 1. Intense focus of increased radiotracer activity in right lateral aspect of prostate gland, extending from apex to base, corresponds to PIRADS 5 lesion identified on MRI/biopsy-proven prostate carcinoma. The intensity of radiotracer activity is compatible with high PSMA expression. A lesion in the left side of the prostate gland demonstrates low PSMA expression. 2. A 6 mm right posterior obturator lymph node with increased radiotracer activity (SUV 13.0), is compatible with metastasis.   Reports chronic lower back pain radiating to the both legs and feet on tramadol 50mg daily. Urine flow is normal. He denies frequency, hesitancy, urgency, incontinence,  and gross hematuria. Nocturia 3.    10/14/22: Since last visit, patient started RT with Dr. Rizo, he has been tolerating this well. He started taking abiraterone and prednisone about 3 weeks ago and so far has felt well. He is taking the medication on an empty stomach between 3 and 4PM each day. He is eating and drinking well. He is a bit more tired than normal, however he he still goes out and walks for 40 minutes a day. He denies urinary symptoms including dysuria, hematuria, urgency, frequency and incontinence. He has infrequent hot flashes, denies night sweats.   11/4/22: Overall patient has been feeling well on abiraterone and prednisone. His appetite and energy are good, he goes on walks every day. Denies dysuria, hematuria, reports nocturia 1-2 times a night. Having normal bowel movements approximately twice a day. Patient completed pelvic RT on 10/31 and after completing he now has pain in his R hip that is worst when moving to stand. Standing up actually makes it better. He has only taken tylenol for the pain which did not help. Patient does have obturator LN on R side and just finished RT which may be causing the pain.   11/21/22 feels tired, denies hot flash and sweating and leg swelling.   12/16/22: K 3.2 at last appt, 1 week of K supplementation improved to 3.9. Patient continues on abiraterone and prednisone. He takes the prednisone with breakfast and takes abiraterone at about 2-3PM as he does not normally have lunch. Overall he has been feeling well, pain that he previously described in hip and abdomen are improved/resolved. He does still take tramadol twice a day. He has been unable to go on walks due to the cold weather however he has been staying active. Reports nocturia x 2. Patient denies fever, chills, headache, vision changes, cough, shortness of breath, chest pain, palpitations, abdominal pain, nausea/vomiting, diarrhea, constipation, dysuria, hematuria, itching, rashes, joint pain, mucositis, changes in sensation, edema.  1/13/23: Patient has been feeling well. Takes abiraterone in the morning on an empty stomach, prednisone with breakfast. Has nocturia 2-4 times a night. Appetite and energy are good. He is hoping to go to [facility] for 2-3 weeks.    3/6/23: continued on chris/pred for high risk localized prostate ca. His BP today is 159/95, he is asymptomatic, he does not take any blood pressure medications. His HR is 101. Pt denies changes in vision, chest pain, palpitations, shortness of breath, nausea/vomiting and paraesthesias. He does have occasional self limiting headaches. He went t his PCP two days ago and his BP was "normal". Pt returned from [facility] last week, he has been very jet lagged and has been sleeping a lot. He reports soreness in his b/l legs, denies swelling. He has ongoing low back pain that he takes tramadol for as needed. He has a non productive cough, has been using OTC cough medication. Denies fever, chills, sore throat, congestion.   3/20/23: continued on chris/pred for high risk localized prostate ca, returns for BP check. His BP today is 130/87, HR 95. They have been taking BP at home however do not have a list of the measurements. They think the systolic is usually in the 130s and diastolic is usually in the 70-80s. He denies headache, blurry vision/vision changes, nausea, vomiting, shortness of breath, chest pain, palpitations, paraesthesias. He is fatigued and does not do very much throughout the day but his energy has improved from 2 weeks ago after returning from [facility]. He is eating well. He has pain in his back down his legs (not neuropathic pain) he follows with pain management and gets back injections, he takes tramadol 50-100mg once a day. The pain he had in his legs at the most recent visit has resolved, no swelling. Denies urine leakage and incontinence, reports nocturia 2-3 Sometimes has pain in his R inguinal area, unchanged from prior. Having normal bowel movements.   4/17/23: continued on chris/pred for high risk localized prostate cancer.  Taking BP every other day at home, normally runs 120s/80s. No burning with urination or pain, no difficulty empty, no leakage, nocturia x 4. Fatigue is at baseline, energy level might be slightly increasing, now that weather is nicer going out for walks, no dyspnea on exertion, can walk one mile. Intermittent pain in his R side, on and off, not severe. Has ongoing low back pain, pain radiates down legs, uses tramadol once a day. No numbness in hands or feet. L Appetite is good. Occasional itching on skin no rash, was given mupirocin by PCP.  No hot flashes or sweating.  Patient denies fever, chills, headache, vision changes, cough, shortness of breath, chest pain, palpitations, abdominal pain, nausea/vomiting, diarrhea, constipation, dysuria, hematuria, itching, rashes, joint pain, mucositis, changes in sensation.   5/24/23: doing well today, no new issues with abiraterone   8/2/23 feels overall fine, denies fatigue, hot flashes and sweating, freguent urination and gross hematuria.   11/1/23: Patient is doing well, eating great. He does have energy, but gets tired easily. He does have trouble sleeping at night, sleeps throughout the day, occasionally takes trazadone or ambien to help with sleep. He has ongoing low back pain, takes tramadol once a day, pain radiates down legs. He has ongoing numbness in his feet, no falls, walks slowly without issue. He has urine leakage at night, is not wearing diaper, no leakage during the day. Nocturia x 3-4. No hematuria or dysuria. Sometimes feels as though he is not completely emptying his bladder. BP today 161/92, denies headache and vision changes, he takes BP at home and generally it runs 160/90, he is not on BP medication, he is seeing PCP today.    1/8/24 feels well , good appetite, reports occasional sweating, denies fatigue/hot flash, Has normal urination.  [de-identified] : prostate adenocarcinoma  [de-identified] : 3/13/24: Patient was admitted to [facility] on Jan 24, he was sent from his PCP to the ED for chest pain and EKG changes, he was found to have type I NSTEMI, cardiac cath showed occluded LAD, distal to prior stent, s/p GEORGIE x 2 Jan 2024. He also had TTE which showed EF 45% and moderate TR. He was also diagnosed with PE, CTA was positive for RLL PE. Patient was started on xarelto, ASA was discontinued. This information was obtained from [facility] portal/discharge summary. His BP today is 148/82, reports that at home systolic BP generally runs about 160s. His HR right not is 105, he denies chest pain and palpitations. He denies shortness of breath/difficulty breathing. No edema. No difficulty walking.   Since being discharged from the hospital, he has been doing okay and recovering. Says his appetite is okay, no issues with constipation or diarrhea. No hot flashes. Nocturia x 2, occ dribbling.    6/13/24 Reports feeling overall fine, denies chest pain, shortness of breath on exertion and leg swelling.   9/9/24 pt is doing well since completing treatment. Appetite is good and maintaining weight. Denies diarrhea or constipation. Denies hot flash/fatigue.  12/11/24:Reports feels well overall. Denies hot flashes, je nausea, vomiting, diarrhea.  3/18/25 - s/p definitive RT to prostate/pelvis completed Oct 2022, s/p ADT x 2yrs (last Eligard 6mo inj given 2/8/24), s/p abiraterone (Sept 2022 - March 2024) which was discontinued early d/t ischemic heart disease and impaired EF. Feeling well. No fatigue. Urine flow is "normal", no urgency/dysuria/hematuria/incontinence, nocturia 2-3x/night. Ongoing arthritic pains in the hands.

## 2025-03-18 NOTE — ASSESSMENT
[FreeTextEntry1] : Mr. Bradley is a 73 year old male with high risk of localized prostate cancer given his positive regional node, T3a, humza 4+4 and PSA 26ng/mg. Given his high risk features he requires Stampede protocol with RT to the pelvic area and prostate in combination with 2 to 3 years of ADT and 2 years of abiraterone/prednisone. He started abiraterone September 2022. Completed RT to pelvis 10/31/22. PSA undetectable as of March 2023. Abiraterone discontinued after 18mo (in early in March 2024) d/t NSTEMI with ischemic heart disease requiring cardiac stents and declining EF. 2 years of Eligard completed with last inj given 2/8/24.   Plan  High risk localized prostate cancer: - 2 years of Eligard completed, last 6mo inj given 2/8/24 - PSA has been undetectable since March 2023. Most recent PSA on 12/11/24 was again undetectable. Repeat PSA today is pending.   CV with dec EF, HTN. PE (diagnosed at [facility] in Jan 2024) - Continue to f/u with Cardiology  Instructed to contact our office with any new/worsening symptoms. Pt and family educated regarding plan of care, all questions/concerns addressed to the best of my abilities and their apparent satisfaction. RTC 3 months

## 2025-03-18 NOTE — HISTORY OF PRESENT ILLNESS
[de-identified] : HPI:  3/17/15 - first saw Dr. ROSALBA Thacker (urology) for symptoms of BPH and PSA of 4.34 ng/mL. He started on Tamsulosin and lost his follow up ever since.   PSA trend: 3/17/15 - 4.34 ng/mL 9/1/17 - 5.30 4/622 - 18.35 5/27/22 - 26.00  6/11/22 - Prostate MRI: 3 Prostate lesions seen, with extraprostatic extension noted and neurovascular bundle involvement by lesion 1 on the right and lesion 3 on the left. No seminal vesicle invasion or pelvic adenopathy. No suspicious bone lesion identified. PIRADS 5  7/6/22 - underwent Prostate Biopsy: Adenocarcinoma of the Prostate in 15/17 cores, Zachery score 4+4 =8 in 1/17 cores, G4+3 in 2/17 cores, G3+4 in 10/17 cores, and G3+3 in 2/17 cores. Perineural invasion identified. Max involvement 90%.   7/21/22 - Bone Scan: No scan evidence of osseous metastasis.  7/27/22 - seen by Dr. Rizo, Radiation Onc [facility] of [facility] cancer RT in combination with ADT and abiraterone/pred  7/28/2022 received eligard 45mg with bicalutamide two weeks prior   8/16/22 PSMA PET (pylarify): 1. Intense focus of increased radiotracer activity in right lateral aspect of prostate gland, extending from apex to base, corresponds to PIRADS 5 lesion identified on MRI/biopsy-proven prostate carcinoma. The intensity of radiotracer activity is compatible with high PSMA expression. A lesion in the left side of the prostate gland demonstrates low PSMA expression. 2. A 6 mm right posterior obturator lymph node with increased radiotracer activity (SUV 13.0), is compatible with metastasis.   Reports chronic lower back pain radiating to the both legs and feet on tramadol 50mg daily. Urine flow is normal. He denies frequency, hesitancy, urgency, incontinence,  and gross hematuria. Nocturia 3.    10/14/22: Since last visit, patient started RT with Dr. Rizo, he has been tolerating this well. He started taking abiraterone and prednisone about 3 weeks ago and so far has felt well. He is taking the medication on an empty stomach between 3 and 4PM each day. He is eating and drinking well. He is a bit more tired than normal, however he he still goes out and walks for 40 minutes a day. He denies urinary symptoms including dysuria, hematuria, urgency, frequency and incontinence. He has infrequent hot flashes, denies night sweats.   11/4/22: Overall patient has been feeling well on abiraterone and prednisone. His appetite and energy are good, he goes on walks every day. Denies dysuria, hematuria, reports nocturia 1-2 times a night. Having normal bowel movements approximately twice a day. Patient completed pelvic RT on 10/31 and after completing he now has pain in his R hip that is worst when moving to stand. Standing up actually makes it better. He has only taken tylenol for the pain which did not help. Patient does have obturator LN on R side and just finished RT which may be causing the pain.   11/21/22 feels tired, denies hot flash and sweating and leg swelling.   12/16/22: K 3.2 at last appt, 1 week of K supplementation improved to 3.9. Patient continues on abiraterone and prednisone. He takes the prednisone with breakfast and takes abiraterone at about 2-3PM as he does not normally have lunch. Overall he has been feeling well, pain that he previously described in hip and abdomen are improved/resolved. He does still take tramadol twice a day. He has been unable to go on walks due to the cold weather however he has been staying active. Reports nocturia x 2. Patient denies fever, chills, headache, vision changes, cough, shortness of breath, chest pain, palpitations, abdominal pain, nausea/vomiting, diarrhea, constipation, dysuria, hematuria, itching, rashes, joint pain, mucositis, changes in sensation, edema.  1/13/23: Patient has been feeling well. Takes abiraterone in the morning on an empty stomach, prednisone with breakfast. Has nocturia 2-4 times a night. Appetite and energy are good. He is hoping to go to [facility] for 2-3 weeks.    3/6/23: continued on chris/pred for high risk localized prostate ca. His BP today is 159/95, he is asymptomatic, he does not take any blood pressure medications. His HR is 101. Pt denies changes in vision, chest pain, palpitations, shortness of breath, nausea/vomiting and paraesthesias. He does have occasional self limiting headaches. He went t his PCP two days ago and his BP was "normal". Pt returned from [facility] last week, he has been very jet lagged and has been sleeping a lot. He reports soreness in his b/l legs, denies swelling. He has ongoing low back pain that he takes tramadol for as needed. He has a non productive cough, has been using OTC cough medication. Denies fever, chills, sore throat, congestion.   3/20/23: continued on chris/pred for high risk localized prostate ca, returns for BP check. His BP today is 130/87, HR 95. They have been taking BP at home however do not have a list of the measurements. They think the systolic is usually in the 130s and diastolic is usually in the 70-80s. He denies headache, blurry vision/vision changes, nausea, vomiting, shortness of breath, chest pain, palpitations, paraesthesias. He is fatigued and does not do very much throughout the day but his energy has improved from 2 weeks ago after returning from [facility]. He is eating well. He has pain in his back down his legs (not neuropathic pain) he follows with pain management and gets back injections, he takes tramadol 50-100mg once a day. The pain he had in his legs at the most recent visit has resolved, no swelling. Denies urine leakage and incontinence, reports nocturia 2-3 Sometimes has pain in his R inguinal area, unchanged from prior. Having normal bowel movements.   4/17/23: continued on chris/pred for high risk localized prostate cancer.  Taking BP every other day at home, normally runs 120s/80s. No burning with urination or pain, no difficulty empty, no leakage, nocturia x 4. Fatigue is at baseline, energy level might be slightly increasing, now that weather is nicer going out for walks, no dyspnea on exertion, can walk one mile. Intermittent pain in his R side, on and off, not severe. Has ongoing low back pain, pain radiates down legs, uses tramadol once a day. No numbness in hands or feet. L Appetite is good. Occasional itching on skin no rash, was given mupirocin by PCP.  No hot flashes or sweating.  Patient denies fever, chills, headache, vision changes, cough, shortness of breath, chest pain, palpitations, abdominal pain, nausea/vomiting, diarrhea, constipation, dysuria, hematuria, itching, rashes, joint pain, mucositis, changes in sensation.   5/24/23: doing well today, no new issues with abiraterone   8/2/23 feels overall fine, denies fatigue, hot flashes and sweating, freguent urination and gross hematuria.   11/1/23: Patient is doing well, eating great. He does have energy, but gets tired easily. He does have trouble sleeping at night, sleeps throughout the day, occasionally takes trazadone or ambien to help with sleep. He has ongoing low back pain, takes tramadol once a day, pain radiates down legs. He has ongoing numbness in his feet, no falls, walks slowly without issue. He has urine leakage at night, is not wearing diaper, no leakage during the day. Nocturia x 3-4. No hematuria or dysuria. Sometimes feels as though he is not completely emptying his bladder. BP today 161/92, denies headache and vision changes, he takes BP at home and generally it runs 160/90, he is not on BP medication, he is seeing PCP today.    1/8/24 feels well , good appetite, reports occasional sweating, denies fatigue/hot flash, Has normal urination.  [de-identified] : prostate adenocarcinoma  [de-identified] : 3/13/24: Patient was admitted to [facility] on Jan 24, he was sent from his PCP to the ED for chest pain and EKG changes, he was found to have type I NSTEMI, cardiac cath showed occluded LAD, distal to prior stent, s/p GEORGIE x 2 Jan 2024. He also had TTE which showed EF 45% and moderate TR. He was also diagnosed with PE, CTA was positive for RLL PE. Patient was started on xarelto, ASA was discontinued. This information was obtained from [facility] portal/discharge summary. His BP today is 148/82, reports that at home systolic BP generally runs about 160s. His HR right not is 105, he denies chest pain and palpitations. He denies shortness of breath/difficulty breathing. No edema. No difficulty walking.   Since being discharged from the hospital, he has been doing okay and recovering. Says his appetite is okay, no issues with constipation or diarrhea. No hot flashes. Nocturia x 2, occ dribbling.    6/13/24 Reports feeling overall fine, denies chest pain, shortness of breath on exertion and leg swelling.   9/9/24 pt is doing well since completing treatment. Appetite is good and maintaining weight. Denies diarrhea or constipation. Denies hot flash/fatigue.  12/11/24:Reports feels well overall. Denies hot flashes, je nausea, vomiting, diarrhea.  3/18/25 - s/p definitive RT to prostate/pelvis completed Oct 2022, s/p ADT x 2yrs (last Eligard 6mo inj given 2/8/24), s/p abiraterone (Sept 2022 - March 2024) which was discontinued early d/t ischemic heart disease and impaired EF. Feeling well. No fatigue. Urine flow is "normal", no urgency/dysuria/hematuria/incontinence, nocturia 2-3x/night. Ongoing arthritic pains in the hands.

## 2025-03-18 NOTE — REVIEW OF SYSTEMS
[Fever] : no fever [Chills] : no chills [Fatigue] : no fatigue [Recent Change In Weight] : ~T no recent weight change [Chest Pain] : no chest pain [Palpitations] : no palpitations [Lower Ext Edema] : no lower extremity edema [Shortness Of Breath] : no shortness of breath [Cough] : no cough [Abdominal Pain] : no abdominal pain [Vomiting] : no vomiting [Constipation] : no constipation [Diarrhea: Grade 0] : Diarrhea: Grade 0 [Dysuria] : no dysuria [Incontinence] : no incontinence [Joint Pain] : joint pain [Joint Stiffness] : joint stiffness [Muscle Pain] : no muscle pain [Muscle Weakness] : no muscle weakness [Dizziness] : no dizziness [Easy Bleeding] : no tendency for easy bleeding [Easy Bruising] : no tendency for easy bruising

## 2025-03-18 NOTE — ASSESSMENT
[FreeTextEntry1] : Mr. Bradley is a 73 year old male with high risk of localized prostate cancer given his positive regional node, T3a, humza 4+4 and PSA 26ng/mg. Given his high risk features he requires Stampede protocol with RT to the pelvic area and prostate in combination with 2 to 3 years of ADT and 2 years of abiraterone/prednisone. He started abiraterone September 2022. Completed RT to pelvis 10/31/22. PSA undetectable as of March 2023. Abiraterone discontinued after 18mo (in early in March 2024) d/t NSTEMI with ischemic heart disease requiring cardiac stents and declining EF. 2 years of Eligard completed with last inj given 2/8/24.   Plan  High risk localized prostate cancer: - 2 years of Eligard completed, last 6mo inj given 2/8/24 - PSA has been undetectable since March 2023. Most recent PSA on 12/11/24 was again undetectable. Repeat PSA today is pending.   CV with dec EF, HTN. PE (diagnosed at [facility] in Jan 2024) - Continue to f/u with Cardiology  Instructed to contact our office with any new/worsening symptoms. Pt and family educated regarding plan of care, all questions/concerns addressed to the best of my abilities and their apparent satisfaction. RTC 3 months

## 2025-06-10 NOTE — ASSESSMENT
[FreeTextEntry1] : Mr. Bradley is a 73 year old male with high risk of localized prostate cancer given his positive regional node, T3a, humza 4+4 and PSA 26ng/mg. Given his high risk features he requires Stampede protocol with RT to the pelvic area and prostate in combination with 2 to 3 years of ADT and 2 years of abiraterone/prednisone. He started abiraterone September 2022. Completed RT to pelvis 10/31/22. PSA undetectable as of March 2023. Abiraterone discontinued after 18mo (in early in March 2024) d/t NSTEMI with ischemic heart disease requiring cardiac stents and declining EF. 2 years of Eligard completed with last inj given 2/8/24.   Plan  High risk localized prostate cancer: - s/p definitive RT to prostate/pelvis completed Oct 2022, s/p ADT x 2yrs (last Eligard 6mo inj given 2/8/24), s/p abiraterone (Sept 2022 - March 2024) which was discontinued early d/t ischemic heart disease and impaired EF - PSA has been undetectable since March 2023. Most recent PSA on 3/18/25 was again undetectable.  - No symptoms concerning for recurrent disease. Repeat PSA today is pending.   CV with dec EF, HTN. PE (diagnosed at [facility] in Jan 2024) - Continue to f/u with Cardiology  Instructed to contact our office with any new/worsening symptoms. Pt and family educated regarding plan of care, all questions/concerns addressed to the best of my abilities and their apparent satisfaction. RTC 3 months

## 2025-06-10 NOTE — HISTORY OF PRESENT ILLNESS
[Disease: _____________________] : Disease: [unfilled] [T: ___] : T[unfilled] [N: ___] : N[unfilled] [M: ___] : M[unfilled] [AJCC Stage: ____] : AJCC Stage: [unfilled] [de-identified] : HPI:  3/17/15 - first saw Dr. ROSALBA Thacker (urology) for symptoms of BPH and PSA of 4.34 ng/mL. He started on Tamsulosin and lost his follow up ever since.   PSA trend: 3/17/15 - 4.34 ng/mL 9/1/17 - 5.30 4/622 - 18.35 5/27/22 - 26.00  6/11/22 - Prostate MRI: 3 Prostate lesions seen, with extraprostatic extension noted and neurovascular bundle involvement by lesion 1 on the right and lesion 3 on the left. No seminal vesicle invasion or pelvic adenopathy. No suspicious bone lesion identified. PIRADS 5  7/6/22 - underwent Prostate Biopsy: Adenocarcinoma of the Prostate in 15/17 cores, Zachery score 4+4 =8 in 1/17 cores, G4+3 in 2/17 cores, G3+4 in 10/17 cores, and G3+3 in 2/17 cores. Perineural invasion identified. Max involvement 90%.   7/21/22 - Bone Scan: No scan evidence of osseous metastasis.  7/27/22 - seen by Dr. Rizo, Radiation Onc [facility] of [facility] prostate cancer RT in combination with ADT and abiraterone/pred  7/28/2022 received eligard 45mg with bicalutamide two weeks prior   8/16/22 PSMA PET (pylarify): 1. Intense focus of increased radiotracer activity in right lateral aspect of prostate gland, extending from apex to base, corresponds to PIRADS 5 lesion identified on MRI/biopsy-proven prostate carcinoma. The intensity of radiotracer activity is compatible with high PSMA expression. A lesion in the left side of the prostate gland demonstrates low PSMA expression. 2. A 6 mm right posterior obturator lymph node with increased radiotracer activity (SUV 13.0), is compatible with metastasis.   Reports chronic lower back pain radiating to the both legs and feet on tramadol 50mg daily. Urine flow is normal. He denies frequency, hesitancy, urgency, incontinence,  and gross hematuria. Nocturia 3.    10/14/22: Since last visit, patient started RT with Dr. Rizo, he has been tolerating this well. He started taking abiraterone and prednisone about 3 weeks ago and so far has felt well. He is taking the medication on an empty stomach between 3 and 4PM each day. He is eating and drinking well. He is a bit more tired than normal, however he he still goes out and walks for 40 minutes a day. He denies urinary symptoms including dysuria, hematuria, urgency, frequency and incontinence. He has infrequent hot flashes, denies night sweats.   11/4/22: Overall patient has been feeling well on abiraterone and prednisone. His appetite and energy are good, he goes on walks every day. Denies dysuria, hematuria, reports nocturia 1-2 times a night. Having normal bowel movements approximately twice a day. Patient completed pelvic RT on 10/31 and after completing he now has pain in his R hip that is worst when moving to stand. Standing up actually makes it better. He has only taken tylenol for the pain which did not help. Patient does have obturator LN on R side and just finished RT which may be causing the pain.   11/21/22 feels tired, denies hot flash and sweating and leg swelling.   12/16/22: K 3.2 at last appt, 1 week of K supplementation improved to 3.9. Patient continues on abiraterone and prednisone. He takes the prednisone with breakfast and takes abiraterone at about 2-3PM as he does not normally have lunch. Overall he has been feeling well, pain that he previously described in hip and abdomen are improved/resolved. He does still take tramadol twice a day. He has been unable to go on walks due to the cold weather however he has been staying active. Reports nocturia x 2. Patient denies fever, chills, headache, vision changes, cough, shortness of breath, chest pain, palpitations, abdominal pain, nausea/vomiting, diarrhea, constipation, dysuria, hematuria, itching, rashes, joint pain, mucositis, changes in sensation, edema.  1/13/23: Patient has been feeling well. Takes abiraterone in the morning on an empty stomach, prednisone with breakfast. Has nocturia 2-4 times a night. Appetite and energy are good. He is hoping to go to [facility] for 2-3 weeks.    3/6/23: continued on chris/pred for high risk localized prostate ca. His BP today is 159/95, he is asymptomatic, he does not take any blood pressure medications. His HR is 101. Pt denies changes in vision, chest pain, palpitations, shortness of breath, nausea/vomiting and paraesthesias. He does have occasional self limiting headaches. He went t his PCP two days ago and his BP was "normal". Pt returned from [facility] last week, he has been very jet lagged and has been sleeping a lot. He reports soreness in his b/l legs, denies swelling. He has ongoing low back pain that he takes tramadol for as needed. He has a non productive cough, has been using OTC cough medication. Denies fever, chills, sore throat, congestion.   3/20/23: continued on chris/pred for high risk localized prostate ca, returns for BP check. His BP today is 130/87, HR 95. They have been taking BP at home however do not have a list of the measurements. They think the systolic is usually in the 130s and diastolic is usually in the 70-80s. He denies headache, blurry vision/vision changes, nausea, vomiting, shortness of breath, chest pain, palpitations, paraesthesias. He is fatigued and does not do very much throughout the day but his energy has improved from 2 weeks ago after returning from [facility]. He is eating well. He has pain in his back down his legs (not neuropathic pain) he follows with pain management and gets back injections, he takes tramadol 50-100mg once a day. The pain he had in his legs at the most recent visit has resolved, no swelling. Denies urine leakage and incontinence, reports nocturia 2-3 Sometimes has pain in his R inguinal area, unchanged from prior. Having normal bowel movements.   4/17/23: continued on chris/pred for high risk localized prostate cancer.  Taking BP every other day at home, normally runs 120s/80s. No burning with urination or pain, no difficulty empty, no leakage, nocturia x 4. Fatigue is at baseline, energy level might be slightly increasing, now that weather is nicer going out for walks, no dyspnea on exertion, can walk one mile. Intermittent pain in his R side, on and off, not severe. Has ongoing low back pain, pain radiates down legs, uses tramadol once a day. No numbness in hands or feet. L Appetite is good. Occasional itching on skin no rash, was given mupirocin by PCP.  No hot flashes or sweating.  Patient denies fever, chills, headache, vision changes, cough, shortness of breath, chest pain, palpitations, abdominal pain, nausea/vomiting, diarrhea, constipation, dysuria, hematuria, itching, rashes, joint pain, mucositis, changes in sensation.   5/24/23: doing well today, no new issues with abiraterone   8/2/23 feels overall fine, denies fatigue, hot flashes and sweating, freguent urination and gross hematuria.   11/1/23: Patient is doing well, eating great. He does have energy, but gets tired easily. He does have trouble sleeping at night, sleeps throughout the day, occasionally takes trazadone or ambien to help with sleep. He has ongoing low back pain, takes tramadol once a day, pain radiates down legs. He has ongoing numbness in his feet, no falls, walks slowly without issue. He has urine leakage at night, is not wearing diaper, no leakage during the day. Nocturia x 3-4. No hematuria or dysuria. Sometimes feels as though he is not completely emptying his bladder. BP today 161/92, denies headache and vision changes, he takes BP at home and generally it runs 160/90, he is not on BP medication, he is seeing PCP today.    1/8/24 feels well , good appetite, reports occasional sweating, denies fatigue/hot flash, Has normal urination.  [de-identified] : prostate adenocarcinoma  [de-identified] : 3/13/24: Patient was admitted to [facility] on Jan 24, he was sent from his PCP to the ED for chest pain and EKG changes, he was found to have type I NSTEMI, cardiac cath showed occluded LAD, distal to prior stent, s/p GEORGIE x 2 Jan 2024. He also had TTE which showed EF 45% and moderate TR. He was also diagnosed with PE, CTA was positive for RLL PE. Patient was started on xarelto, ASA was discontinued. This information was obtained from [facility] portal/discharge summary. His BP today is 148/82, reports that at home systolic BP generally runs about 160s. His HR right not is 105, he denies chest pain and palpitations. He denies shortness of breath/difficulty breathing. No edema. No difficulty walking.   Since being discharged from the hospital, he has been doing okay and recovering. Says his appetite is okay, no issues with constipation or diarrhea. No hot flashes. Nocturia x 2, occ dribbling.    6/13/24 Reports feeling overall fine, denies chest pain, shortness of breath on exertion and leg swelling.   9/9/24 pt is doing well since completing treatment. Appetite is good and maintaining weight. Denies diarrhea or constipation. Denies hot flash/fatigue.  12/11/24:Reports feels well overall. Denies hot flashes, je nausea, vomiting, diarrhea.  3/18/25 - s/p definitive RT to prostate/pelvis completed Oct 2022, s/p ADT x 2yrs (last Eligard 6mo inj given 2/8/24), s/p abiraterone (Sept 2022 - March 2024) which was discontinued early d/t ischemic heart disease and impaired EF. Feeling well. No fatigue. Urine flow is "normal", no urgency/dysuria/hematuria/incontinence, nocturia 2-3x/night. Ongoing arthritic pains in the hands.   6/17/25 -

## 2025-06-10 NOTE — ASSESSMENT
[FreeTextEntry1] : Mr. Bradley is a 73 year old male with high risk of localized prostate cancer given his positive regional node, T3a, humza 4+4 and PSA 26ng/mg. Given his high risk features he requires Stampede protocol with RT to the pelvic area and prostate in combination with 2 to 3 years of ADT and 2 years of abiraterone/prednisone. He started abiraterone September 2022. Completed RT to pelvis 10/31/22. PSA undetectable as of March 2023. Abiraterone discontinued after 18mo (in early in March 2024) d/t NSTEMI with ischemic heart disease requiring cardiac stents and declining EF. 2 years of Eligard completed with last inj given 2/8/24.   Plan  High risk localized prostate cancer: - s/p definitive RT to prostate/pelvis completed Oct 2022, s/p ADT x 2yrs (last Eligard 6mo inj given 2/8/24), s/p abiraterone (Sept 2022 - March 2024) which was discontinued early d/t ischemic heart disease and impaired EF - PSA has been undetectable since March 2023. Most recent PSA on 3/18/25 was again undetectable.  - No symptoms concerning for recurrent disease. Repeat PSA today is pending.   CV with dec EF, HTN. PE (diagnosed at [facility] in Jan 2024) - Continue to f/u with Cardiology  Instructed to contact our office with any new/worsening symptoms. Pt and family educated regarding plan of care, all questions/concerns addressed to the best of my abilities and their apparent satisfaction. RTC 3 months

## 2025-06-10 NOTE — HISTORY OF PRESENT ILLNESS
[Disease: _____________________] : Disease: [unfilled] [T: ___] : T[unfilled] [N: ___] : N[unfilled] [M: ___] : M[unfilled] [AJCC Stage: ____] : AJCC Stage: [unfilled] [de-identified] : prostate adenocarcinoma  [de-identified] : HPI:  3/17/15 - first saw Dr. ROSALBA Thacker (urology) for symptoms of BPH and PSA of 4.34 ng/mL. He started on Tamsulosin and lost his follow up ever since.   PSA trend: 3/17/15 - 4.34 ng/mL 9/1/17 - 5.30 4/622 - 18.35 5/27/22 - 26.00  6/11/22 - Prostate MRI: 3 Prostate lesions seen, with extraprostatic extension noted and neurovascular bundle involvement by lesion 1 on the right and lesion 3 on the left. No seminal vesicle invasion or pelvic adenopathy. No suspicious bone lesion identified. PIRADS 5  7/6/22 - underwent Prostate Biopsy: Adenocarcinoma of the Prostate in 15/17 cores, Zachery score 4+4 =8 in 1/17 cores, G4+3 in 2/17 cores, G3+4 in 10/17 cores, and G3+3 in 2/17 cores. Perineural invasion identified. Max involvement 90%.   7/21/22 - Bone Scan: No scan evidence of osseous metastasis.  7/27/22 - seen by Dr. Rizo, Radiation Onc [facility] risk of localized prostate cancer RT in combination with ADT and abiraterone/pred  7/28/2022 received eligard 45mg with bicalutamide two weeks prior   8/16/22 PSMA PET (pylarify): 1. Intense focus of increased radiotracer activity in right lateral aspect of prostate gland, extending from apex to base, corresponds to PIRADS 5 lesion identified on MRI/biopsy-proven prostate carcinoma. The intensity of radiotracer activity is compatible with high PSMA expression. A lesion in the left side of the prostate gland demonstrates low PSMA expression. 2. A 6 mm right posterior obturator lymph node with increased radiotracer activity (SUV 13.0), is compatible with metastasis.   Reports chronic lower back pain radiating to the both legs and feet on tramadol 50mg daily. Urine flow is normal. He denies frequency, hesitancy, urgency, incontinence,  and gross hematuria. Nocturia 3.    10/14/22: Since last visit, patient started RT with Dr. Rizo, he has been tolerating this well. He started taking abiraterone and prednisone about 3 weeks ago and so far has felt well. He is taking the medication on an empty stomach between 3 and 4PM each day. He is eating and drinking well. He is a bit more tired than normal, however he he still goes out and walks for 40 minutes a day. He denies urinary symptoms including dysuria, hematuria, urgency, frequency and incontinence. He has infrequent hot flashes, denies night sweats.   11/4/22: Overall patient has been feeling well on abiraterone and prednisone. His appetite and energy are good, he goes on walks every day. Denies dysuria, hematuria, reports nocturia 1-2 times a night. Having normal bowel movements approximately twice a day. Patient completed pelvic RT on 10/31 and after completing he now has pain in his R hip that is worst when moving to stand. Standing up actually makes it better. He has only taken tylenol for the pain which did not help. Patient does have obturator LN on R side and just finished RT which may be causing the pain.   11/21/22 feels tired, denies hot flash and sweating and leg swelling.   12/16/22: K 3.2 at last appt, 1 week of K supplementation improved to 3.9. Patient continues on abiraterone and prednisone. He takes the prednisone with breakfast and takes abiraterone at about 2-3PM as he does not normally have lunch. Overall he has been feeling well, pain that he previously described in hip and abdomen are improved/resolved. He does still take tramadol twice a day. He has been unable to go on walks due to the cold weather however he has been staying active. Reports nocturia x 2. Patient denies fever, chills, headache, vision changes, cough, shortness of breath, chest pain, palpitations, abdominal pain, nausea/vomiting, diarrhea, constipation, dysuria, hematuria, itching, rashes, joint pain, mucositis, changes in sensation, edema.  1/13/23: Patient has been feeling well. Takes abiraterone in the morning on an empty stomach, prednisone with breakfast. Has nocturia 2-4 times a night. Appetite and energy are good. He is hoping to go to [facility] for 2-3 weeks.    3/6/23: continued on chris/pred for high risk localized prostate ca. His BP today is 159/95, he is asymptomatic, he does not take any blood pressure medications. His HR is 101. Pt denies changes in vision, chest pain, palpitations, shortness of breath, nausea/vomiting and paraesthesias. He does have occasional self limiting headaches. He went t his PCP two days ago and his BP was "normal". Pt returned from [facility] last week, he has been very jet lagged and has been sleeping a lot. He reports soreness in his b/l legs, denies swelling. He has ongoing low back pain that he takes tramadol for as needed. He has a non productive cough, has been using OTC cough medication. Denies fever, chills, sore throat, congestion.   3/20/23: continued on chris/pred for high risk localized prostate ca, returns for BP check. His BP today is 130/87, HR 95. They have been taking BP at home however do not have a list of the measurements. They think the systolic is usually in the 130s and diastolic is usually in the 70-80s. He denies headache, blurry vision/vision changes, nausea, vomiting, shortness of breath, chest pain, palpitations, paraesthesias. He is fatigued and does not do very much throughout the day but his energy has improved from 2 weeks ago after returning from [facility]. He is eating well. He has pain in his back down his legs (not neuropathic pain) he follows with pain management and gets back injections, he takes tramadol 50-100mg once a day. The pain he had in his legs at the most recent visit has resolved, no swelling. Denies urine leakage and incontinence, reports nocturia 2-3 Sometimes has pain in his R inguinal area, unchanged from prior. Having normal bowel movements.   4/17/23: continued on chris/pred for high risk localized prostate cancer.  Taking BP every other day at home, normally runs 120s/80s. No burning with urination or pain, no difficulty empty, no leakage, nocturia x 4. Fatigue is at baseline, energy level might be slightly increasing, now that weather is nicer going out for walks, no dyspnea on exertion, can walk one mile. Intermittent pain in his R side, on and off, not severe. Has ongoing low back pain, pain radiates down legs, uses tramadol once a day. No numbness in hands or feet. L Appetite is good. Occasional itching on skin no rash, was given mupirocin by PCP.  No hot flashes or sweating.  Patient denies fever, chills, headache, vision changes, cough, shortness of breath, chest pain, palpitations, abdominal pain, nausea/vomiting, diarrhea, constipation, dysuria, hematuria, itching, rashes, joint pain, mucositis, changes in sensation.   5/24/23: doing well today, no new issues with abiraterone   8/2/23 feels overall fine, denies fatigue, hot flashes and sweating, freguent urination and gross hematuria.   11/1/23: Patient is doing well, eating great. He does have energy, but gets tired easily. He does have trouble sleeping at night, sleeps throughout the day, occasionally takes trazadone or ambien to help with sleep. He has ongoing low back pain, takes tramadol once a day, pain radiates down legs. He has ongoing numbness in his feet, no falls, walks slowly without issue. He has urine leakage at night, is not wearing diaper, no leakage during the day. Nocturia x 3-4. No hematuria or dysuria. Sometimes feels as though he is not completely emptying his bladder. BP today 161/92, denies headache and vision changes, he takes BP at home and generally it runs 160/90, he is not on BP medication, he is seeing PCP today.    1/8/24 feels well , good appetite, reports occasional sweating, denies fatigue/hot flash, Has normal urination.  [de-identified] : 3/13/24: Patient was admitted to [facility] on Jan 24, he was sent from his PCP to the ED for chest pain and EKG changes, he was found to have type I NSTEMI, cardiac cath showed occluded LAD, distal to prior stent, s/p GEORGIE x 2 Jan 2024. He also had TTE which showed EF 45% and moderate TR. He was also diagnosed with PE, CTA was positive for RLL PE. Patient was started on xarelto, ASA was discontinued. This information was obtained from [facility] portal/discharge summary. His BP today is 148/82, reports that at home systolic BP generally runs about 160s. His HR right not is 105, he denies chest pain and palpitations. He denies shortness of breath/difficulty breathing. No edema. No difficulty walking.   Since being discharged from the hospital, he has been doing okay and recovering. Says his appetite is okay, no issues with constipation or diarrhea. No hot flashes. Nocturia x 2, occ dribbling.    6/13/24 Reports feeling overall fine, denies chest pain, shortness of breath on exertion and leg swelling.   9/9/24 pt is doing well since completing treatment. Appetite is good and maintaining weight. Denies diarrhea or constipation. Denies hot flash/fatigue.  12/11/24:Reports feels well overall. Denies hot flashes, je nausea, vomiting, diarrhea.  3/18/25 - s/p definitive RT to prostate/pelvis completed Oct 2022, s/p ADT x 2yrs (last Eligard 6mo inj given 2/8/24), s/p abiraterone (Sept 2022 - March 2024) which was discontinued early d/t ischemic heart disease and impaired EF. Feeling well. No fatigue. Urine flow is "normal", no urgency/dysuria/hematuria/incontinence, nocturia 2-3x/night. Ongoing arthritic pains in the hands.   6/17/25 -